# Patient Record
Sex: MALE | Race: WHITE | Employment: OTHER | URBAN - NONMETROPOLITAN AREA
[De-identification: names, ages, dates, MRNs, and addresses within clinical notes are randomized per-mention and may not be internally consistent; named-entity substitution may affect disease eponyms.]

---

## 2017-01-04 ENCOUNTER — TELEPHONE (OUTPATIENT)
Dept: FAMILY MEDICINE CLINIC | Age: 47
End: 2017-01-04

## 2017-02-23 ENCOUNTER — HOSPITAL ENCOUNTER (OUTPATIENT)
Dept: OTHER | Age: 47
Discharge: HOME | End: 2017-02-23
Attending: FAMILY MEDICINE | Admitting: FAMILY MEDICINE

## 2017-02-23 ENCOUNTER — OFFICE VISIT (OUTPATIENT)
Dept: FAMILY MEDICINE CLINIC | Age: 47
End: 2017-02-23
Payer: MEDICARE

## 2017-02-23 VITALS
DIASTOLIC BLOOD PRESSURE: 60 MMHG | HEART RATE: 68 BPM | OXYGEN SATURATION: 90 % | SYSTOLIC BLOOD PRESSURE: 108 MMHG | RESPIRATION RATE: 18 BRPM

## 2017-02-23 DIAGNOSIS — E03.9 HYPOTHYROIDISM, UNSPECIFIED TYPE: ICD-10-CM

## 2017-02-23 DIAGNOSIS — G40.909 SEIZURE DISORDER (HCC): ICD-10-CM

## 2017-02-23 DIAGNOSIS — N50.89 SCROTAL SWELLING: Primary | ICD-10-CM

## 2017-02-23 DIAGNOSIS — F84.0 AUTISM: ICD-10-CM

## 2017-02-23 LAB
A/G RATIO: 1 (ref 0.8–2)
ALBUMIN SERPL-MCNC: 4.1 G/DL (ref 3.4–4.8)
ALP BLD-CCNC: 53 U/L (ref 25–100)
ALT SERPL-CCNC: 33 U/L (ref 4–36)
ANION GAP SERPL CALCULATED.3IONS-SCNC: 14 MMOL/L (ref 3–16)
AST SERPL-CCNC: 23 U/L (ref 8–33)
BASOPHILS ABSOLUTE: 0.1 K/UL (ref 0–0.1)
BASOPHILS RELATIVE PERCENT: 0.5 %
BILIRUB SERPL-MCNC: 0.5 MG/DL (ref 0.3–1.2)
BUN BLDV-MCNC: 11 MG/DL (ref 6–20)
CALCIUM SERPL-MCNC: 9.4 MG/DL (ref 8.5–10.5)
CHLORIDE BLD-SCNC: 102 MMOL/L (ref 98–107)
CO2: 23 MMOL/L (ref 20–30)
CREAT SERPL-MCNC: 0.6 MG/DL (ref 0.4–1.2)
EOSINOPHILS ABSOLUTE: 0.3 K/UL (ref 0–0.4)
EOSINOPHILS RELATIVE PERCENT: 2.2 %
GFR AFRICAN AMERICAN: >59
GFR NON-AFRICAN AMERICAN: >59
GLOBULIN: 4 G/DL
GLUCOSE BLD-MCNC: 82 MG/DL (ref 74–106)
HCT VFR BLD CALC: 48.2 % (ref 40–54)
HEMOGLOBIN: 16 G/DL (ref 13–18)
LYMPHOCYTES ABSOLUTE: 4.9 K/UL (ref 1.5–4)
LYMPHOCYTES RELATIVE PERCENT: 42.4 % (ref 20–40)
MCH RBC QN AUTO: 30.2 PG (ref 27–32)
MCHC RBC AUTO-ENTMCNC: 33.2 G/DL (ref 31–35)
MCV RBC AUTO: 90.9 FL (ref 80–100)
MONOCYTES ABSOLUTE: 1.5 K/UL (ref 0.2–0.8)
MONOCYTES RELATIVE PERCENT: 12.8 % (ref 3–10)
NEUTROPHILS ABSOLUTE: 4.9 K/UL (ref 2–7.5)
NEUTROPHILS RELATIVE PERCENT: 42.1 %
PDW BLD-RTO: 14.2 % (ref 11–16)
PLATELET # BLD: 242 K/UL (ref 150–400)
PMV BLD AUTO: 12.9 FL (ref 6–10)
POTASSIUM SERPL-SCNC: 4.1 MMOL/L (ref 3.4–5.1)
RBC # BLD: 5.3 M/UL (ref 4.5–6)
SODIUM BLD-SCNC: 139 MMOL/L (ref 136–145)
T4 FREE: 1.06 NG/DL (ref 0.89–1.76)
TOTAL PROTEIN: 8.1 G/DL (ref 6.4–8.3)
TSH SERPL DL<=0.05 MIU/L-ACNC: 2.28 UIU/ML (ref 0.35–5.5)
WBC # BLD: 11.6 K/UL (ref 4–11)

## 2017-02-23 PROCEDURE — 1036F TOBACCO NON-USER: CPT | Performed by: FAMILY MEDICINE

## 2017-02-23 PROCEDURE — G8484 FLU IMMUNIZE NO ADMIN: HCPCS | Performed by: FAMILY MEDICINE

## 2017-02-23 PROCEDURE — G8427 DOCREV CUR MEDS BY ELIG CLIN: HCPCS | Performed by: FAMILY MEDICINE

## 2017-02-23 PROCEDURE — G8421 BMI NOT CALCULATED: HCPCS | Performed by: FAMILY MEDICINE

## 2017-02-23 PROCEDURE — 99214 OFFICE O/P EST MOD 30 MIN: CPT | Performed by: FAMILY MEDICINE

## 2017-02-23 RX ORDER — POLYETHYLENE GLYCOL 3350 17 G/17G
POWDER, FOR SOLUTION ORAL
Refills: 5 | COMMUNITY
Start: 2017-01-18 | End: 2017-06-14 | Stop reason: SDUPTHER

## 2017-02-28 DIAGNOSIS — N50.89 SCROTAL SWELLING: ICD-10-CM

## 2017-03-02 DIAGNOSIS — N50.89 MASS OF LEFT TESTICLE: Primary | ICD-10-CM

## 2017-06-14 ENCOUNTER — OFFICE VISIT (OUTPATIENT)
Dept: FAMILY MEDICINE CLINIC | Age: 47
End: 2017-06-14
Payer: MEDICARE

## 2017-06-14 VITALS
OXYGEN SATURATION: 96 % | SYSTOLIC BLOOD PRESSURE: 112 MMHG | DIASTOLIC BLOOD PRESSURE: 78 MMHG | RESPIRATION RATE: 16 BRPM | HEART RATE: 71 BPM

## 2017-06-14 DIAGNOSIS — J01.10 ACUTE FRONTAL SINUSITIS, RECURRENCE NOT SPECIFIED: Primary | ICD-10-CM

## 2017-06-14 DIAGNOSIS — E03.9 HYPOTHYROIDISM, UNSPECIFIED TYPE: ICD-10-CM

## 2017-06-14 DIAGNOSIS — K21.9 GASTROESOPHAGEAL REFLUX DISEASE WITHOUT ESOPHAGITIS: ICD-10-CM

## 2017-06-14 DIAGNOSIS — G40.909 SEIZURE DISORDER (HCC): ICD-10-CM

## 2017-06-14 DIAGNOSIS — K59.00 CONSTIPATION, UNSPECIFIED CONSTIPATION TYPE: ICD-10-CM

## 2017-06-14 DIAGNOSIS — F84.0 AUTISM: ICD-10-CM

## 2017-06-14 DIAGNOSIS — J40 BRONCHITIS: ICD-10-CM

## 2017-06-14 DIAGNOSIS — R53.83 FATIGUE, UNSPECIFIED TYPE: ICD-10-CM

## 2017-06-14 PROCEDURE — G8427 DOCREV CUR MEDS BY ELIG CLIN: HCPCS | Performed by: NURSE PRACTITIONER

## 2017-06-14 PROCEDURE — 99214 OFFICE O/P EST MOD 30 MIN: CPT | Performed by: NURSE PRACTITIONER

## 2017-06-14 PROCEDURE — 96372 THER/PROPH/DIAG INJ SC/IM: CPT | Performed by: NURSE PRACTITIONER

## 2017-06-14 PROCEDURE — 1036F TOBACCO NON-USER: CPT | Performed by: NURSE PRACTITIONER

## 2017-06-14 PROCEDURE — G8421 BMI NOT CALCULATED: HCPCS | Performed by: NURSE PRACTITIONER

## 2017-06-14 RX ORDER — FLUTICASONE PROPIONATE 50 MCG
2 SPRAY, SUSPENSION (ML) NASAL DAILY
Qty: 1 BOTTLE | Refills: 5 | Status: SHIPPED | OUTPATIENT
Start: 2017-06-14 | End: 2017-10-17 | Stop reason: ALTCHOICE

## 2017-06-14 RX ORDER — LEVOTHYROXINE SODIUM 50 MCG
50 TABLET ORAL DAILY
Qty: 30 TABLET | Refills: 5 | Status: SHIPPED | OUTPATIENT
Start: 2017-06-14 | End: 2017-11-07 | Stop reason: SDUPTHER

## 2017-06-14 RX ORDER — POLYETHYLENE GLYCOL 3350 17 G/17G
17 POWDER, FOR SOLUTION ORAL DAILY
Qty: 1 BOTTLE | Refills: 5 | Status: SHIPPED | OUTPATIENT
Start: 2017-06-14 | End: 2017-11-07 | Stop reason: SDUPTHER

## 2017-06-14 RX ORDER — DIVALPROEX SODIUM 250 MG
500 TABLET, DELAYED RELEASE (ENTERIC COATED) ORAL 3 TIMES DAILY
Qty: 180 TABLET | Refills: 5 | Status: SHIPPED | OUTPATIENT
Start: 2017-06-14 | End: 2017-11-07 | Stop reason: SDUPTHER

## 2017-06-14 RX ORDER — CEFTRIAXONE 1 G/1
1 INJECTION, POWDER, FOR SOLUTION INTRAMUSCULAR; INTRAVENOUS ONCE
Status: COMPLETED | OUTPATIENT
Start: 2017-06-14 | End: 2017-06-14

## 2017-06-14 RX ORDER — DOCUSATE SODIUM 100 MG/1
100 CAPSULE, LIQUID FILLED ORAL 2 TIMES DAILY
Qty: 60 CAPSULE | Refills: 5 | Status: SHIPPED | OUTPATIENT
Start: 2017-06-14 | End: 2017-10-17 | Stop reason: CLARIF

## 2017-06-14 RX ORDER — TOPIRAMATE 100 MG
100 TABLET ORAL 4 TIMES DAILY
Qty: 120 TABLET | Refills: 5 | Status: SHIPPED | OUTPATIENT
Start: 2017-06-14 | End: 2017-11-07 | Stop reason: SDUPTHER

## 2017-06-14 RX ORDER — AZITHROMYCIN 500 MG/1
500 TABLET, FILM COATED ORAL DAILY
Qty: 7 TABLET | Refills: 0 | Status: SHIPPED | OUTPATIENT
Start: 2017-06-14 | End: 2017-06-21

## 2017-06-14 RX ADMIN — CEFTRIAXONE 1 G: 1 INJECTION, POWDER, FOR SOLUTION INTRAMUSCULAR; INTRAVENOUS at 15:35

## 2017-06-14 ASSESSMENT — ENCOUNTER SYMPTOMS: COUGH: 1

## 2017-06-14 ASSESSMENT — PATIENT HEALTH QUESTIONNAIRE - PHQ9
2. FEELING DOWN, DEPRESSED OR HOPELESS: 0
SUM OF ALL RESPONSES TO PHQ QUESTIONS 1-9: 0
1. LITTLE INTEREST OR PLEASURE IN DOING THINGS: 0
SUM OF ALL RESPONSES TO PHQ9 QUESTIONS 1 & 2: 0

## 2017-06-20 ENCOUNTER — OFFICE VISIT (OUTPATIENT)
Dept: FAMILY MEDICINE CLINIC | Age: 47
End: 2017-06-20
Payer: MEDICARE

## 2017-06-20 VITALS
HEART RATE: 97 BPM | SYSTOLIC BLOOD PRESSURE: 128 MMHG | OXYGEN SATURATION: 90 % | DIASTOLIC BLOOD PRESSURE: 80 MMHG | RESPIRATION RATE: 16 BRPM

## 2017-06-20 DIAGNOSIS — J01.10 ACUTE FRONTAL SINUSITIS, RECURRENCE NOT SPECIFIED: Primary | ICD-10-CM

## 2017-06-20 DIAGNOSIS — G47.00 INSOMNIA, UNSPECIFIED TYPE: ICD-10-CM

## 2017-06-20 DIAGNOSIS — H66.91 RIGHT OTITIS MEDIA, UNSPECIFIED CHRONICITY, UNSPECIFIED OTITIS MEDIA TYPE: ICD-10-CM

## 2017-06-20 DIAGNOSIS — J40 BRONCHITIS: ICD-10-CM

## 2017-06-20 PROCEDURE — 1036F TOBACCO NON-USER: CPT | Performed by: NURSE PRACTITIONER

## 2017-06-20 PROCEDURE — G8427 DOCREV CUR MEDS BY ELIG CLIN: HCPCS | Performed by: NURSE PRACTITIONER

## 2017-06-20 PROCEDURE — 96372 THER/PROPH/DIAG INJ SC/IM: CPT | Performed by: NURSE PRACTITIONER

## 2017-06-20 PROCEDURE — 99213 OFFICE O/P EST LOW 20 MIN: CPT | Performed by: NURSE PRACTITIONER

## 2017-06-20 PROCEDURE — G8421 BMI NOT CALCULATED: HCPCS | Performed by: NURSE PRACTITIONER

## 2017-06-20 RX ORDER — AMOXICILLIN AND CLAVULANATE POTASSIUM 875; 125 MG/1; MG/1
1 TABLET, FILM COATED ORAL 2 TIMES DAILY
Qty: 20 TABLET | Refills: 0 | Status: SHIPPED | OUTPATIENT
Start: 2017-06-20 | End: 2017-06-30

## 2017-06-20 RX ORDER — TRAZODONE HYDROCHLORIDE 50 MG/1
TABLET ORAL
Qty: 60 TABLET | Refills: 3 | Status: SHIPPED | OUTPATIENT
Start: 2017-06-20 | End: 2017-11-07 | Stop reason: SDUPTHER

## 2017-06-20 RX ORDER — METHYLPREDNISOLONE SODIUM SUCCINATE 125 MG/2ML
125 INJECTION, POWDER, LYOPHILIZED, FOR SOLUTION INTRAMUSCULAR; INTRAVENOUS ONCE
Status: COMPLETED | OUTPATIENT
Start: 2017-06-20 | End: 2017-06-20

## 2017-06-20 RX ADMIN — METHYLPREDNISOLONE SODIUM SUCCINATE 125 MG: 125 INJECTION, POWDER, LYOPHILIZED, FOR SOLUTION INTRAMUSCULAR; INTRAVENOUS at 13:42

## 2017-06-30 ASSESSMENT — ENCOUNTER SYMPTOMS
SORE THROAT: 1
COUGH: 1
RHINORRHEA: 1
CONSTIPATION: 1
CONSTIPATION: 1
RHINORRHEA: 1
SORE THROAT: 1

## 2017-08-15 ENCOUNTER — OFFICE VISIT (OUTPATIENT)
Dept: FAMILY MEDICINE CLINIC | Age: 47
End: 2017-08-15
Payer: MEDICARE

## 2017-08-15 ENCOUNTER — HOSPITAL ENCOUNTER (OUTPATIENT)
Dept: OTHER | Age: 47
Discharge: HOME | End: 2017-08-15
Attending: FAMILY MEDICINE | Admitting: FAMILY MEDICINE

## 2017-08-15 VITALS
DIASTOLIC BLOOD PRESSURE: 78 MMHG | SYSTOLIC BLOOD PRESSURE: 112 MMHG | RESPIRATION RATE: 18 BRPM | OXYGEN SATURATION: 98 %

## 2017-08-15 DIAGNOSIS — L21.9 SEBORRHEIC DERMATITIS OF SCALP: ICD-10-CM

## 2017-08-15 DIAGNOSIS — E03.9 HYPOTHYROIDISM, UNSPECIFIED TYPE: ICD-10-CM

## 2017-08-15 DIAGNOSIS — L81.9 HYPERPIGMENTATION OF SKIN: ICD-10-CM

## 2017-08-15 DIAGNOSIS — R35.0 URINARY FREQUENCY: ICD-10-CM

## 2017-08-15 DIAGNOSIS — R23.9 IMPAIRED SKIN INTEGRITY: ICD-10-CM

## 2017-08-15 DIAGNOSIS — F84.0 AUTISM: ICD-10-CM

## 2017-08-15 DIAGNOSIS — R35.1 NOCTURIA: ICD-10-CM

## 2017-08-15 DIAGNOSIS — G40.909 SEIZURE DISORDER (HCC): ICD-10-CM

## 2017-08-15 DIAGNOSIS — Z99.3 WHEELCHAIR BOUND: ICD-10-CM

## 2017-08-15 DIAGNOSIS — R35.0 URINARY FREQUENCY: Primary | ICD-10-CM

## 2017-08-15 DIAGNOSIS — N39.0 URINARY TRACT INFECTION WITHOUT HEMATURIA, SITE UNSPECIFIED: ICD-10-CM

## 2017-08-15 DIAGNOSIS — R47.01 NONVERBAL: ICD-10-CM

## 2017-08-15 DIAGNOSIS — L30.9 DERMATITIS: ICD-10-CM

## 2017-08-15 DIAGNOSIS — B36.0 TINEA VERSICOLOR: ICD-10-CM

## 2017-08-15 DIAGNOSIS — R62.50 DEVELOPMENT DELAY: ICD-10-CM

## 2017-08-15 DIAGNOSIS — K59.00 CONSTIPATION, UNSPECIFIED CONSTIPATION TYPE: ICD-10-CM

## 2017-08-15 LAB
A/G RATIO: 1 (ref 0.8–2)
ALBUMIN SERPL-MCNC: 3.9 G/DL (ref 3.4–4.8)
ALP BLD-CCNC: 45 U/L (ref 25–100)
ALT SERPL-CCNC: 31 U/L (ref 4–36)
ANION GAP SERPL CALCULATED.3IONS-SCNC: 13 MMOL/L (ref 3–16)
AST SERPL-CCNC: 22 U/L (ref 8–33)
BASOPHILS ABSOLUTE: 0.1 K/UL (ref 0–0.1)
BASOPHILS RELATIVE PERCENT: 0.8 %
BILIRUB SERPL-MCNC: 0.3 MG/DL (ref 0.3–1.2)
BUN BLDV-MCNC: 9 MG/DL (ref 6–20)
CALCIUM SERPL-MCNC: 9.4 MG/DL (ref 8.5–10.5)
CHLORIDE BLD-SCNC: 103 MMOL/L (ref 98–107)
CO2: 24 MMOL/L (ref 20–30)
CREAT SERPL-MCNC: 0.6 MG/DL (ref 0.4–1.2)
EOSINOPHILS ABSOLUTE: 0.4 K/UL (ref 0–0.4)
EOSINOPHILS RELATIVE PERCENT: 4.1 %
GFR AFRICAN AMERICAN: >59
GFR NON-AFRICAN AMERICAN: >59
GLOBULIN: 3.8 G/DL
GLUCOSE BLD-MCNC: 83 MG/DL (ref 74–106)
HCT VFR BLD CALC: 47.2 % (ref 40–54)
HEMOGLOBIN: 15.9 G/DL (ref 13–18)
LYMPHOCYTES ABSOLUTE: 4 K/UL (ref 1.5–4)
LYMPHOCYTES RELATIVE PERCENT: 38.8 % (ref 20–40)
MCH RBC QN AUTO: 30.3 PG (ref 27–32)
MCHC RBC AUTO-ENTMCNC: 33.7 G/DL (ref 31–35)
MCV RBC AUTO: 89.9 FL (ref 80–100)
MONOCYTES ABSOLUTE: 1.2 K/UL (ref 0.2–0.8)
MONOCYTES RELATIVE PERCENT: 11.7 % (ref 3–10)
NEUTROPHILS ABSOLUTE: 4.6 K/UL (ref 2–7.5)
NEUTROPHILS RELATIVE PERCENT: 44.6 %
PDW BLD-RTO: 13.9 % (ref 11–16)
PLATELET # BLD: 228 K/UL (ref 150–400)
PMV BLD AUTO: 13 FL (ref 6–10)
POTASSIUM SERPL-SCNC: 4 MMOL/L (ref 3.4–5.1)
RBC # BLD: 5.25 M/UL (ref 4.5–6)
SODIUM BLD-SCNC: 140 MMOL/L (ref 136–145)
TOTAL PROTEIN: 7.7 G/DL (ref 6.4–8.3)
TSH SERPL DL<=0.05 MIU/L-ACNC: 1.48 UIU/ML (ref 0.35–5.5)
WBC # BLD: 10.3 K/UL (ref 4–11)

## 2017-08-15 PROCEDURE — 99214 OFFICE O/P EST MOD 30 MIN: CPT | Performed by: NURSE PRACTITIONER

## 2017-08-15 PROCEDURE — G8427 DOCREV CUR MEDS BY ELIG CLIN: HCPCS | Performed by: NURSE PRACTITIONER

## 2017-08-15 PROCEDURE — G8421 BMI NOT CALCULATED: HCPCS | Performed by: NURSE PRACTITIONER

## 2017-08-15 PROCEDURE — 1036F TOBACCO NON-USER: CPT | Performed by: NURSE PRACTITIONER

## 2017-08-16 ENCOUNTER — HOSPITAL ENCOUNTER (OUTPATIENT)
Dept: OTHER | Age: 47
Discharge: HOME | End: 2017-08-16
Attending: FAMILY MEDICINE | Admitting: FAMILY MEDICINE

## 2017-08-16 PROBLEM — L21.9 SEBORRHEIC DERMATITIS OF SCALP: Status: ACTIVE | Noted: 2017-08-16

## 2017-08-16 PROBLEM — R35.1 NOCTURIA: Status: ACTIVE | Noted: 2017-08-16

## 2017-08-16 PROBLEM — Z99.3 WHEELCHAIR BOUND: Status: ACTIVE | Noted: 2017-08-16

## 2017-08-16 PROBLEM — R62.50 DEVELOPMENT DELAY: Status: ACTIVE | Noted: 2017-08-16

## 2017-08-16 PROBLEM — R47.01 NONVERBAL: Status: ACTIVE | Noted: 2017-08-16

## 2017-08-16 RX ORDER — KETOCONAZOLE 20 MG/G
CREAM TOPICAL
Qty: 60 G | Refills: 2 | Status: SHIPPED | OUTPATIENT
Start: 2017-08-16 | End: 2017-10-17 | Stop reason: ALTCHOICE

## 2017-08-16 RX ORDER — KETOCONAZOLE 20 MG/ML
SHAMPOO TOPICAL
Qty: 120 ML | Refills: 5 | Status: SHIPPED | OUTPATIENT
Start: 2017-08-16 | End: 2017-10-17 | Stop reason: ALTCHOICE

## 2017-08-17 ENCOUNTER — TELEPHONE (OUTPATIENT)
Dept: FAMILY MEDICINE CLINIC | Age: 47
End: 2017-08-17

## 2017-08-17 LAB
VALPROIC ACID % FREE: 10 % (ref 5–18.4)
VALPROIC ACID LEVEL: 73 UG/ML (ref 50–100)
VALPROIC ACID, FREE: 7.3 UG/ML (ref 4–15)
VALPROIC DATE LAST DOSE: NORMAL
VALPROIC DOSE AMOUNT: NORMAL
VALPROIC TIME LAST DOSE: NORMAL

## 2017-08-19 LAB — URINE CULTURE, ROUTINE: NORMAL

## 2017-08-24 PROBLEM — R15.9 FULL INCONTINENCE OF FECES: Status: ACTIVE | Noted: 2017-08-24

## 2017-08-24 PROBLEM — N39.42 URINARY INCONTINENCE WITHOUT SENSORY AWARENESS: Status: ACTIVE | Noted: 2017-08-24

## 2017-10-17 ENCOUNTER — OFFICE VISIT (OUTPATIENT)
Dept: FAMILY MEDICINE CLINIC | Age: 47
End: 2017-10-17
Payer: MEDICARE

## 2017-10-17 VITALS
RESPIRATION RATE: 18 BRPM | DIASTOLIC BLOOD PRESSURE: 60 MMHG | OXYGEN SATURATION: 98 % | HEART RATE: 100 BPM | SYSTOLIC BLOOD PRESSURE: 114 MMHG

## 2017-10-17 DIAGNOSIS — Z23 INFLUENZA VACCINE NEEDED: ICD-10-CM

## 2017-10-17 DIAGNOSIS — R62.50 DEVELOPMENT DELAY: ICD-10-CM

## 2017-10-17 DIAGNOSIS — Z74.01 BEDRIDDEN: ICD-10-CM

## 2017-10-17 DIAGNOSIS — R21 SKIN RASH: Primary | ICD-10-CM

## 2017-10-17 DIAGNOSIS — N39.42 URINARY INCONTINENCE WITHOUT SENSORY AWARENESS: ICD-10-CM

## 2017-10-17 DIAGNOSIS — F84.0 AUTISM: ICD-10-CM

## 2017-10-17 DIAGNOSIS — Z99.3 WHEELCHAIR BOUND: ICD-10-CM

## 2017-10-17 DIAGNOSIS — R15.9 FULL INCONTINENCE OF FECES: ICD-10-CM

## 2017-10-17 DIAGNOSIS — G40.909 SEIZURE DISORDER (HCC): ICD-10-CM

## 2017-10-17 PROCEDURE — 90688 IIV4 VACCINE SPLT 0.5 ML IM: CPT | Performed by: NURSE PRACTITIONER

## 2017-10-17 PROCEDURE — G0008 ADMIN INFLUENZA VIRUS VAC: HCPCS | Performed by: NURSE PRACTITIONER

## 2017-10-17 PROCEDURE — 99214 OFFICE O/P EST MOD 30 MIN: CPT | Performed by: NURSE PRACTITIONER

## 2017-10-17 PROCEDURE — 1036F TOBACCO NON-USER: CPT | Performed by: NURSE PRACTITIONER

## 2017-10-17 PROCEDURE — G8421 BMI NOT CALCULATED: HCPCS | Performed by: NURSE PRACTITIONER

## 2017-10-17 PROCEDURE — G8484 FLU IMMUNIZE NO ADMIN: HCPCS | Performed by: NURSE PRACTITIONER

## 2017-10-17 PROCEDURE — G8427 DOCREV CUR MEDS BY ELIG CLIN: HCPCS | Performed by: NURSE PRACTITIONER

## 2017-10-17 RX ORDER — NYSTATIN 100000 [USP'U]/G
POWDER TOPICAL
Qty: 1 BOTTLE | Refills: 3 | Status: SHIPPED | OUTPATIENT
Start: 2017-10-17 | End: 2018-01-31 | Stop reason: ALTCHOICE

## 2017-10-17 NOTE — PROGRESS NOTES
OLDER, IM, MDV, 0.5ML (FLUZONE QUADV)   7. Wheelchair bound     8. Development delay          Orders Placed This Encounter   Procedures    INFLUENZA, QUADV, 3 YRS AND OLDER, IM, MDV, 0.5ML (FLUZONE QUADV)        Outpatient Encounter Prescriptions as of 10/17/2017   Medication Sig Dispense Refill    nystatin (MYCOSTATIN) 287386 UNIT/GM powder Apply to rash area 3 times daily. 1 Bottle 3    triamcinolone (KENALOG) 0.1 % ointment Apply on rash twice daily as needed. 30 g 5    traZODone (DESYREL) 50 MG tablet Take 1-2 tablets at bedtime for sleep. 60 tablet 3    polyethylene glycol (GLYCOLAX) powder Take 17 g by mouth daily 1 Bottle 5    SYNTHROID 50 MCG tablet Take 1 tablet by mouth daily 30 tablet 5    DEPAKOTE 250 MG DR tablet Take 2 tablets by mouth 3 times daily Take two tablets by mouth three times a day 180 tablet 5    NEXIUM 40 MG delayed release capsule Take 1 capsule by mouth daily 30 capsule 5    TOPAMAX 100 MG tablet Take 1 tablet by mouth 4 times daily 120 tablet 5    XALATAN 0.005 % ophthalmic solution       [DISCONTINUED] ketoconazole (NIZORAL) 2 % cream Apply topically daily to affected area (neck) for 2 weeks. . 60 g 2    [DISCONTINUED] ketoconazole (NIZORAL) 2 % shampoo Apply 10ml to wet scalp, shampoo, leave 5 minutes, and rinse. Do this daily for 2 weeks. 120 mL 5    [DISCONTINUED] fluticasone (FLONASE) 50 MCG/ACT nasal spray 2 sprays by Nasal route daily 1 Bottle 5    [DISCONTINUED] docusate sodium (COLACE) 100 MG capsule Take 1 capsule by mouth 2 times daily 60 capsule 5    levocetirizine (XYZAL) 5 MG tablet Take 1 tablet by mouth nightly 30 tablet 11    loratadine (CLARITIN) 10 MG tablet Take 1 tablet by mouth daily 30 tablet 1     No facility-administered encounter medications on file as of 10/17/2017. Return if symptoms worsen or fail to improve.       PATIENT COUNSELING    Counseling was provided today regarding the following topics: Healthy eating habits, Regular exercise, substance abuse and healthy sleep habits. Discussed use, benefit, and side effects of prescribed medications. Barriers to medication compliance addressed. All patient questions answered. Pt voiced understanding.

## 2017-10-31 ENCOUNTER — TELEPHONE (OUTPATIENT)
Dept: FAMILY MEDICINE CLINIC | Age: 47
End: 2017-10-31

## 2017-11-03 NOTE — TELEPHONE ENCOUNTER
Parents said that insurance wouldn't pay for this previously. I meant to call some places to see how much it would cost to rent one and I forgot about it. Would Medicare pay for this if we wrote a script for it? Parents are getting too elderly to lift him all the time. How can I go about getting this done or just seeing if it would be covered. Parents have tried before and had issues with getting any assistance for it.

## 2017-11-05 PROBLEM — Z74.01 BEDRIDDEN: Status: ACTIVE | Noted: 2017-11-05

## 2017-11-07 ENCOUNTER — OFFICE VISIT (OUTPATIENT)
Dept: FAMILY MEDICINE CLINIC | Age: 47
End: 2017-11-07
Payer: MEDICARE

## 2017-11-07 VITALS
DIASTOLIC BLOOD PRESSURE: 76 MMHG | OXYGEN SATURATION: 97 % | RESPIRATION RATE: 18 BRPM | SYSTOLIC BLOOD PRESSURE: 118 MMHG | HEART RATE: 64 BPM

## 2017-11-07 DIAGNOSIS — R15.9 FULL INCONTINENCE OF FECES: ICD-10-CM

## 2017-11-07 DIAGNOSIS — F84.0 AUTISM: ICD-10-CM

## 2017-11-07 DIAGNOSIS — G40.909 SEIZURE DISORDER (HCC): ICD-10-CM

## 2017-11-07 DIAGNOSIS — Z99.3 WHEELCHAIR BOUND: ICD-10-CM

## 2017-11-07 DIAGNOSIS — J30.89 ENVIRONMENTAL AND SEASONAL ALLERGIES: ICD-10-CM

## 2017-11-07 DIAGNOSIS — L30.9 ECZEMA, UNSPECIFIED TYPE: ICD-10-CM

## 2017-11-07 DIAGNOSIS — E03.9 HYPOTHYROIDISM, UNSPECIFIED TYPE: ICD-10-CM

## 2017-11-07 DIAGNOSIS — N39.42 URINARY INCONTINENCE WITHOUT SENSORY AWARENESS: ICD-10-CM

## 2017-11-07 DIAGNOSIS — R33.9 UNABLE TO EMPTY BLADDER: ICD-10-CM

## 2017-11-07 DIAGNOSIS — H02.826 MOLL'S GLAND CYST OF LEFT EYE: ICD-10-CM

## 2017-11-07 DIAGNOSIS — K59.00 CONSTIPATION, UNSPECIFIED CONSTIPATION TYPE: ICD-10-CM

## 2017-11-07 DIAGNOSIS — Z74.01 BEDRIDDEN: ICD-10-CM

## 2017-11-07 DIAGNOSIS — R39.16 URINARY STRAINING: ICD-10-CM

## 2017-11-07 DIAGNOSIS — R35.0 URINARY FREQUENCY: ICD-10-CM

## 2017-11-07 DIAGNOSIS — K21.9 GASTROESOPHAGEAL REFLUX DISEASE WITHOUT ESOPHAGITIS: ICD-10-CM

## 2017-11-07 DIAGNOSIS — L89.152 DECUBITUS ULCER OF SACRAL REGION, STAGE 2 (HCC): Primary | ICD-10-CM

## 2017-11-07 DIAGNOSIS — G47.00 INSOMNIA, UNSPECIFIED TYPE: ICD-10-CM

## 2017-11-07 PROCEDURE — G8421 BMI NOT CALCULATED: HCPCS | Performed by: NURSE PRACTITIONER

## 2017-11-07 PROCEDURE — 1036F TOBACCO NON-USER: CPT | Performed by: NURSE PRACTITIONER

## 2017-11-07 PROCEDURE — G8427 DOCREV CUR MEDS BY ELIG CLIN: HCPCS | Performed by: NURSE PRACTITIONER

## 2017-11-07 PROCEDURE — 99214 OFFICE O/P EST MOD 30 MIN: CPT | Performed by: NURSE PRACTITIONER

## 2017-11-07 PROCEDURE — G8484 FLU IMMUNIZE NO ADMIN: HCPCS | Performed by: NURSE PRACTITIONER

## 2017-11-07 RX ORDER — LORATADINE 10 MG/1
10 TABLET ORAL DAILY
Qty: 30 TABLET | Refills: 5 | Status: SHIPPED | OUTPATIENT
Start: 2017-11-07 | End: 2018-01-31 | Stop reason: SINTOL

## 2017-11-07 RX ORDER — TOPIRAMATE 100 MG
100 TABLET ORAL 4 TIMES DAILY
Qty: 120 TABLET | Refills: 5 | Status: SHIPPED | OUTPATIENT
Start: 2017-11-07 | End: 2018-01-11 | Stop reason: CLARIF

## 2017-11-07 RX ORDER — TAMSULOSIN HYDROCHLORIDE 0.4 MG/1
0.4 CAPSULE ORAL DAILY
Qty: 30 CAPSULE | Refills: 3 | Status: SHIPPED | OUTPATIENT
Start: 2017-11-07 | End: 2018-01-31

## 2017-11-07 RX ORDER — LEVOTHYROXINE SODIUM 50 MCG
50 TABLET ORAL DAILY
Qty: 30 TABLET | Refills: 5 | Status: SHIPPED | OUTPATIENT
Start: 2017-11-07 | End: 2018-01-31 | Stop reason: DRUGHIGH

## 2017-11-07 RX ORDER — TRAZODONE HYDROCHLORIDE 50 MG/1
TABLET ORAL
Qty: 60 TABLET | Refills: 5 | Status: SHIPPED | OUTPATIENT
Start: 2017-11-07 | End: 2018-01-31 | Stop reason: SINTOL

## 2017-11-07 RX ORDER — POLYETHYLENE GLYCOL 3350 17 G/17G
17 POWDER, FOR SOLUTION ORAL DAILY
Qty: 1 BOTTLE | Refills: 5 | Status: SHIPPED | OUTPATIENT
Start: 2017-11-07 | End: 2019-01-28 | Stop reason: SDUPTHER

## 2017-11-07 RX ORDER — DIVALPROEX SODIUM 250 MG
500 TABLET, DELAYED RELEASE (ENTERIC COATED) ORAL 3 TIMES DAILY
Qty: 180 TABLET | Refills: 5 | Status: SHIPPED | OUTPATIENT
Start: 2017-11-07 | End: 2018-01-31 | Stop reason: DRUGHIGH

## 2017-11-11 PROBLEM — L89.151 DECUBITUS ULCER OF SACRAL REGION, STAGE 1: Status: ACTIVE | Noted: 2017-11-11

## 2017-11-11 PROBLEM — L30.9 ECZEMA: Status: ACTIVE | Noted: 2017-11-11

## 2017-11-11 PROBLEM — L89.152 DECUBITUS ULCER OF SACRAL REGION, STAGE 2 (HCC): Status: ACTIVE | Noted: 2017-11-11

## 2017-11-11 PROBLEM — H02.826: Status: ACTIVE | Noted: 2017-11-11

## 2017-11-11 ASSESSMENT — ENCOUNTER SYMPTOMS: CONSTIPATION: 1

## 2017-11-11 NOTE — PROGRESS NOTES
SUBJECTIVE:  Allison Fonseca is a 50 y.o. male that presents with   Chief Complaint   Patient presents with    Rash     on buttocks   . HPI:    Patient presents for follow-up. Parents state that the rash on his sacrum has not resolved and is getting worse. Patient was laid supine and rolled onto his side with the assist of parents, family member, and nurses. Sacrum has non-blanchable redness with skin breakdown of the epidermis showing a stage II pressure ulcer. Family was educated on the need to rotate patient every 2 hours and relieve pressure off of the sacrum area. His insurance is willing to pay for a manual lift to help parents transfer patient from wheelchair to bed. His hospital bed is several years old and worn down. Insurance will be contacted about supplying patient with a bed that has some sort of a pressure relieving mattress such as a gel overlay, pressure relieving cushion for his wheelchair, and sacrum foam dressings to prevent further breakdown. Patient has full urinary and fecal incontinence and the foam dressing will also prevent infection due to contact with urine and feces. Home health referral has been made to assist with patient bathing and wound care. Parents report that patient is only urinating about 3 times daily. He gets full suprapubic distention and seems to frequently strain to urinate. When he does urinate it is a large amount. He is previously seen urology and was evaluated for prostate issues. He last saw urology one year ago and did not have any prostate issues at that time. He is drinking an adequate amount of water. Based upon patient's cues and behaviors parents feel that he is having urinary frequency but is unable to empty his bladder completely. Parents will attempt to get a urine sample at home and bring this by the clinic to rule out urinary tract infection. He has a history of constipation but the daily MiraLAX has helped to regulate his bowel movements.  He has a range of motion. Bilateral lower extremity weakness. Not able to stand on his own. Neurological: He is alert and oriented to person, place, and time. Skin: Skin is warm and dry. Rash noted. There is erythema. Stage I pressure ulcer around sacrum. Seborrheic dermatitis on scalp with flaking dandruff. Several small light brown macular patches on left lower neck. Redness of lower jaw bilaterally due to self inflicted injury. Eczema on lower left jaw. Psychiatric: He is slowed and withdrawn. He is noncommunicative. Developmentally delayed and nonverbal. He appeared calmer today while waiting. He is inattentive. Nursing note and vitals reviewed. No results found for requested labs within last 30 days. LDL Calculated (mg/dL)   Date Value   12/01/2016 138 (H)         Lab Results   Component Value Date    WBC 10.3 08/15/2017    NEUTROABS 4.6 08/15/2017    HGB 15.9 08/15/2017    HCT 47.2 08/15/2017    MCV 89.9 08/15/2017     08/15/2017       Lab Results   Component Value Date    TSH 1.48 08/15/2017         ASSESSMENT/PLAN:  1. Decubitus ulcer of sacral region, stage 2  Amb External Referral To Home Health   2. Urinary straining  tamsulosin (FLOMAX) 0.4 MG capsule   3. Eczema, unspecified type     4. Moll's gland cyst of left eye     5. Hypothyroidism, unspecified type  SYNTHROID 50 MCG tablet   6. Gastroesophageal reflux disease without esophagitis  NEXIUM 40 MG delayed release capsule   7. Constipation, unspecified constipation type  polyethylene glycol (GLYCOLAX) powder   8. Unable to empty bladder     9. Autism     10. Environmental and seasonal allergies  loratadine (CLARITIN) 10 MG tablet   11. Urinary frequency  tamsulosin (FLOMAX) 0.4 MG capsule   12. Wheelchair bound  DME Order for Hospital Bed as OP    Amb External Referral To Home Health   13. Bedridden  DME Order for Hospital Bed as OP    Amb External Referral To Home Health   14.  Full incontinence of feces  Amb External

## 2017-11-21 ENCOUNTER — TELEPHONE (OUTPATIENT)
Dept: FAMILY MEDICINE CLINIC | Age: 47
End: 2017-11-21

## 2017-11-21 DIAGNOSIS — G40.909 SEIZURE DISORDER (HCC): ICD-10-CM

## 2017-11-21 DIAGNOSIS — R47.01 NONVERBAL: ICD-10-CM

## 2017-11-21 DIAGNOSIS — R15.9 FULL INCONTINENCE OF FECES: ICD-10-CM

## 2017-11-21 DIAGNOSIS — L89.152 DECUBITUS ULCER OF SACRAL REGION, STAGE 2 (HCC): ICD-10-CM

## 2017-11-21 DIAGNOSIS — Z99.3 WHEELCHAIR BOUND: Primary | ICD-10-CM

## 2017-11-21 DIAGNOSIS — N39.42 URINARY INCONTINENCE WITHOUT SENSORY AWARENESS: ICD-10-CM

## 2017-11-21 DIAGNOSIS — F84.0 AUTISM: ICD-10-CM

## 2017-11-21 DIAGNOSIS — Z74.01 BEDRIDDEN: ICD-10-CM

## 2017-11-21 DIAGNOSIS — R62.50 DEVELOPMENT DELAY: ICD-10-CM

## 2017-11-21 NOTE — TELEPHONE ENCOUNTER
Patient's sister states has checked with waiver services and was told that [address] [facility] Jimy Mccullough would be able to see him along with waiver services.   She is requesting referral.

## 2018-01-09 ENCOUNTER — TELEPHONE (OUTPATIENT)
Dept: FAMILY MEDICINE CLINIC | Age: 48
End: 2018-01-09

## 2018-01-11 RX ORDER — TOPIRAMATE 100 MG/1
100 TABLET, FILM COATED ORAL 4 TIMES DAILY
Qty: 120 TABLET | Refills: 5 | Status: SHIPPED | OUTPATIENT
Start: 2018-01-11 | End: 2018-02-15 | Stop reason: DRUGHIGH

## 2018-01-31 ENCOUNTER — HOSPITAL ENCOUNTER (OUTPATIENT)
Dept: OTHER | Age: 48
Discharge: HOME | End: 2018-01-31
Attending: NURSE PRACTITIONER | Admitting: NURSE PRACTITIONER

## 2018-01-31 ENCOUNTER — OFFICE VISIT (OUTPATIENT)
Dept: FAMILY MEDICINE CLINIC | Age: 48
End: 2018-01-31
Payer: MEDICARE

## 2018-01-31 VITALS — RESPIRATION RATE: 24 BRPM | HEART RATE: 100 BPM | DIASTOLIC BLOOD PRESSURE: 64 MMHG | SYSTOLIC BLOOD PRESSURE: 120 MMHG

## 2018-01-31 DIAGNOSIS — R63.4 WEIGHT LOSS: ICD-10-CM

## 2018-01-31 DIAGNOSIS — F84.0 AUTISM: ICD-10-CM

## 2018-01-31 DIAGNOSIS — R13.19 ESOPHAGEAL DYSPHAGIA: Primary | ICD-10-CM

## 2018-01-31 DIAGNOSIS — R63.0 ANOREXIA: ICD-10-CM

## 2018-01-31 DIAGNOSIS — G40.909 SEIZURE DISORDER (HCC): ICD-10-CM

## 2018-01-31 DIAGNOSIS — E03.9 HYPOTHYROIDISM, UNSPECIFIED TYPE: ICD-10-CM

## 2018-01-31 PROCEDURE — G8427 DOCREV CUR MEDS BY ELIG CLIN: HCPCS | Performed by: NURSE PRACTITIONER

## 2018-01-31 PROCEDURE — G8421 BMI NOT CALCULATED: HCPCS | Performed by: NURSE PRACTITIONER

## 2018-01-31 PROCEDURE — 1036F TOBACCO NON-USER: CPT | Performed by: NURSE PRACTITIONER

## 2018-01-31 PROCEDURE — 99214 OFFICE O/P EST MOD 30 MIN: CPT | Performed by: NURSE PRACTITIONER

## 2018-01-31 PROCEDURE — G8484 FLU IMMUNIZE NO ADMIN: HCPCS | Performed by: NURSE PRACTITIONER

## 2018-01-31 RX ORDER — LEVOTHYROXINE SODIUM 0.05 MG/1
TABLET ORAL
COMMUNITY
End: 2018-02-15 | Stop reason: ALTCHOICE

## 2018-01-31 RX ORDER — DIVALPROEX SODIUM 250 MG/1
TABLET, DELAYED RELEASE ORAL
COMMUNITY
End: 2018-08-22 | Stop reason: CLARIF

## 2018-01-31 NOTE — PROGRESS NOTES
Have you seen any other physician or provider since your last visit yes [facility] Admission    Have you had any other diagnostic tests since your last visit? yes - Radiology/Labs during admission    Have you changed or stopped any medications since your last visit?  yes - Stopped Trazodone due to side effects
Take 1 tablet by mouth daily 30 tablet 5    [DISCONTINUED] tamsulosin (FLOMAX) 0.4 MG capsule Take 1 capsule by mouth daily 30 capsule 3    [DISCONTINUED] nystatin (MYCOSTATIN) 749723 UNIT/GM powder Apply to rash area 3 times daily. 1 Bottle 3    [DISCONTINUED] levocetirizine (XYZAL) 5 MG tablet Take 1 tablet by mouth nightly 30 tablet 11     No facility-administered encounter medications on file as of 1/31/2018. No Follow-up on file. PATIENT COUNSELING    Counseling was provided today regarding the following topics: Healthy eating habits, Regular exercise, substance abuse and healthy sleep habits. Discussed use, benefit, and side effects of prescribed medications. Barriers to medication compliance addressed. All patient questions answered. Pt voiced understanding.

## 2018-02-01 DIAGNOSIS — E03.9 HYPOTHYROIDISM, UNSPECIFIED TYPE: ICD-10-CM

## 2018-02-01 DIAGNOSIS — R13.19 ESOPHAGEAL DYSPHAGIA: ICD-10-CM

## 2018-02-01 DIAGNOSIS — R63.4 WEIGHT LOSS: ICD-10-CM

## 2018-02-01 LAB
A/G RATIO: 1 (ref 0.8–2)
ALBUMIN SERPL-MCNC: 4.1 G/DL (ref 3.4–4.8)
ALP BLD-CCNC: 48 U/L (ref 25–100)
ALT SERPL-CCNC: 30 U/L (ref 4–36)
ANION GAP SERPL CALCULATED.3IONS-SCNC: 16 MMOL/L (ref 3–16)
AST SERPL-CCNC: 21 U/L (ref 8–33)
BASOPHILS ABSOLUTE: 0.1 K/UL (ref 0–0.1)
BASOPHILS RELATIVE PERCENT: 0.6 %
BILIRUB SERPL-MCNC: 0.3 MG/DL (ref 0.3–1.2)
BUN BLDV-MCNC: 12 MG/DL (ref 6–20)
CALCIUM SERPL-MCNC: 9.4 MG/DL (ref 8.5–10.5)
CHLORIDE BLD-SCNC: 103 MMOL/L (ref 98–107)
CO2: 22 MMOL/L (ref 20–30)
CREAT SERPL-MCNC: 0.6 MG/DL (ref 0.4–1.2)
EOSINOPHILS ABSOLUTE: 0.4 K/UL (ref 0–0.4)
EOSINOPHILS RELATIVE PERCENT: 3.3 %
GFR AFRICAN AMERICAN: >59
GFR NON-AFRICAN AMERICAN: >59
GLOBULIN: 4.3 G/DL
GLUCOSE BLD-MCNC: 83 MG/DL (ref 74–106)
HCT VFR BLD CALC: 51.5 % (ref 40–54)
HEMOGLOBIN: 16.7 G/DL (ref 13–18)
IMMATURE GRANULOCYTES #: 0.1 K/UL
IMMATURE GRANULOCYTES %: 0.8 % (ref 0–5)
LYMPHOCYTES ABSOLUTE: 4.2 K/UL (ref 1.5–4)
LYMPHOCYTES RELATIVE PERCENT: 30.9 %
MCH RBC QN AUTO: 30.4 PG (ref 27–32)
MCHC RBC AUTO-ENTMCNC: 32.4 G/DL (ref 31–35)
MCV RBC AUTO: 93.8 FL (ref 80–100)
MONOCYTES ABSOLUTE: 1.8 K/UL (ref 0.2–0.8)
MONOCYTES RELATIVE PERCENT: 13.1 %
NEUTROPHILS ABSOLUTE: 6.9 K/UL (ref 2–7.5)
NEUTROPHILS RELATIVE PERCENT: 51.3 %
PDW BLD-RTO: 13.9 % (ref 11–16)
PLATELET # BLD: 187 K/UL (ref 150–400)
PMV BLD AUTO: 15 FL (ref 6–10)
POTASSIUM SERPL-SCNC: 4.6 MMOL/L (ref 3.4–5.1)
RBC # BLD: 5.49 M/UL (ref 4.5–6)
SODIUM BLD-SCNC: 141 MMOL/L (ref 136–145)
T4 FREE: 0.87 NG/DL (ref 0.89–1.76)
TOTAL PROTEIN: 8.4 G/DL (ref 6.4–8.3)
TSH REFLEX: 1.31 UIU/ML (ref 0.35–5.5)
WBC # BLD: 13.5 K/UL (ref 4–11)

## 2018-02-15 ENCOUNTER — OFFICE VISIT (OUTPATIENT)
Dept: FAMILY MEDICINE CLINIC | Age: 48
End: 2018-02-15
Payer: MEDICARE

## 2018-02-15 VITALS — HEART RATE: 65 BPM | OXYGEN SATURATION: 97 %

## 2018-02-15 DIAGNOSIS — E03.9 HYPOTHYROIDISM, UNSPECIFIED TYPE: Primary | ICD-10-CM

## 2018-02-15 DIAGNOSIS — K58.2 IRRITABLE BOWEL SYNDROME WITH BOTH CONSTIPATION AND DIARRHEA: ICD-10-CM

## 2018-02-15 DIAGNOSIS — L98.421 SKIN ULCER OF SACRUM, LIMITED TO BREAKDOWN OF SKIN (HCC): ICD-10-CM

## 2018-02-15 DIAGNOSIS — F84.0 AUTISM: ICD-10-CM

## 2018-02-15 DIAGNOSIS — G40.909 SEIZURE DISORDER (HCC): ICD-10-CM

## 2018-02-15 PROCEDURE — G8427 DOCREV CUR MEDS BY ELIG CLIN: HCPCS | Performed by: NURSE PRACTITIONER

## 2018-02-15 PROCEDURE — 1036F TOBACCO NON-USER: CPT | Performed by: NURSE PRACTITIONER

## 2018-02-15 PROCEDURE — 3017F COLORECTAL CA SCREEN DOC REV: CPT | Performed by: NURSE PRACTITIONER

## 2018-02-15 PROCEDURE — G8482 FLU IMMUNIZE ORDER/ADMIN: HCPCS | Performed by: NURSE PRACTITIONER

## 2018-02-15 PROCEDURE — G8421 BMI NOT CALCULATED: HCPCS | Performed by: NURSE PRACTITIONER

## 2018-02-15 PROCEDURE — 99214 OFFICE O/P EST MOD 30 MIN: CPT | Performed by: NURSE PRACTITIONER

## 2018-02-15 RX ORDER — LEVOTHYROXINE SODIUM 0.03 MG/1
25 TABLET ORAL DAILY
Qty: 30 TABLET | Refills: 3 | Status: SHIPPED | OUTPATIENT
Start: 2018-02-15 | End: 2018-07-13 | Stop reason: SDUPTHER

## 2018-02-15 RX ORDER — TOPIRAMATE 100 MG/1
100 TABLET, FILM COATED ORAL 2 TIMES DAILY
COMMUNITY
End: 2019-01-27

## 2018-02-16 ASSESSMENT — ENCOUNTER SYMPTOMS: CONSTIPATION: 1

## 2018-02-25 ASSESSMENT — ENCOUNTER SYMPTOMS: CONSTIPATION: 1

## 2018-03-16 ENCOUNTER — TELEPHONE (OUTPATIENT)
Dept: FAMILY MEDICINE CLINIC | Age: 48
End: 2018-03-16

## 2018-03-16 DIAGNOSIS — R21 SKIN RASH: ICD-10-CM

## 2018-03-16 RX ORDER — NYSTATIN 100000 [USP'U]/G
POWDER TOPICAL
Qty: 1 BOTTLE | Refills: 3 | Status: SHIPPED | OUTPATIENT
Start: 2018-03-16 | End: 2018-09-19 | Stop reason: SDUPTHER

## 2018-03-16 RX ORDER — NYSTATIN 100000 U/G
CREAM TOPICAL
Qty: 30 G | Refills: 2 | Status: SHIPPED | OUTPATIENT
Start: 2018-03-16 | End: 2018-09-19 | Stop reason: SDUPTHER

## 2018-03-16 RX ORDER — FLUCONAZOLE 100 MG/1
TABLET ORAL
Qty: 2 TABLET | Refills: 0 | Status: SHIPPED | OUTPATIENT
Start: 2018-03-16 | End: 2018-09-19 | Stop reason: ALTCHOICE

## 2018-03-31 PROBLEM — K58.2 IRRITABLE BOWEL SYNDROME WITH BOTH CONSTIPATION AND DIARRHEA: Status: ACTIVE | Noted: 2018-03-31

## 2018-06-19 DIAGNOSIS — J01.10 ACUTE FRONTAL SINUSITIS, RECURRENCE NOT SPECIFIED: ICD-10-CM

## 2018-06-19 RX ORDER — FLUTICASONE PROPIONATE 50 MCG
SPRAY, SUSPENSION (ML) NASAL
Qty: 16 G | Refills: 5 | Status: SHIPPED | OUTPATIENT
Start: 2018-06-19

## 2018-07-13 DIAGNOSIS — E03.9 HYPOTHYROIDISM, UNSPECIFIED TYPE: ICD-10-CM

## 2018-07-13 RX ORDER — LEVOTHYROXINE SODIUM 25 MCG
TABLET ORAL
Qty: 30 TABLET | Refills: 3 | Status: SHIPPED | OUTPATIENT
Start: 2018-07-13 | End: 2018-09-19 | Stop reason: SDUPTHER

## 2018-08-22 ENCOUNTER — TELEPHONE (OUTPATIENT)
Dept: FAMILY MEDICINE CLINIC | Age: 48
End: 2018-08-22

## 2018-08-22 DIAGNOSIS — G40.909 SEIZURE DISORDER (HCC): ICD-10-CM

## 2018-08-22 RX ORDER — DIVALPROEX SODIUM 125 MG/1
500 CAPSULE, COATED PELLETS ORAL 3 TIMES DAILY
Qty: 360 CAPSULE | Refills: 5 | Status: SHIPPED | OUTPATIENT
Start: 2018-08-22 | End: 2019-02-26 | Stop reason: SDUPTHER

## 2018-08-22 NOTE — TELEPHONE ENCOUNTER
The appt was for the Mom and this was mentioned on the way out the door. I forgot. I'll put them in now. Thanks.

## 2018-08-22 NOTE — TELEPHONE ENCOUNTER
Phone call from [address], friend of family calling on behalf or patient to check status of depakote sprinkles, states they have not been sent to pharmacy.

## 2018-09-19 ENCOUNTER — OFFICE VISIT (OUTPATIENT)
Dept: FAMILY MEDICINE CLINIC | Age: 48
End: 2018-09-19
Payer: MEDICARE

## 2018-09-19 ENCOUNTER — HOSPITAL ENCOUNTER (OUTPATIENT)
Facility: HOSPITAL | Age: 48
Discharge: HOME | End: 2018-09-19
Payer: MEDICARE

## 2018-09-19 VITALS
SYSTOLIC BLOOD PRESSURE: 98 MMHG | RESPIRATION RATE: 20 BRPM | DIASTOLIC BLOOD PRESSURE: 80 MMHG | OXYGEN SATURATION: 94 % | HEART RATE: 69 BPM

## 2018-09-19 DIAGNOSIS — Z13.1 SCREENING FOR DIABETES MELLITUS: ICD-10-CM

## 2018-09-19 DIAGNOSIS — Z13.0 SCREENING FOR BLOOD DISEASE: ICD-10-CM

## 2018-09-19 DIAGNOSIS — Z79.899 ON VALPROIC ACID THERAPY: ICD-10-CM

## 2018-09-19 DIAGNOSIS — L30.9 ECZEMA, UNSPECIFIED TYPE: ICD-10-CM

## 2018-09-19 DIAGNOSIS — K21.9 GASTROESOPHAGEAL REFLUX DISEASE WITHOUT ESOPHAGITIS: Primary | ICD-10-CM

## 2018-09-19 DIAGNOSIS — Z23 INFLUENZA VACCINE NEEDED: ICD-10-CM

## 2018-09-19 DIAGNOSIS — B37.2 SKIN YEAST INFECTION: ICD-10-CM

## 2018-09-19 DIAGNOSIS — E03.9 HYPOTHYROIDISM, UNSPECIFIED TYPE: ICD-10-CM

## 2018-09-19 DIAGNOSIS — R73.9 HYPERGLYCEMIA: ICD-10-CM

## 2018-09-19 DIAGNOSIS — K59.00 CONSTIPATION, UNSPECIFIED CONSTIPATION TYPE: ICD-10-CM

## 2018-09-19 PROCEDURE — 90688 IIV4 VACCINE SPLT 0.5 ML IM: CPT | Performed by: NURSE PRACTITIONER

## 2018-09-19 PROCEDURE — 99214 OFFICE O/P EST MOD 30 MIN: CPT | Performed by: NURSE PRACTITIONER

## 2018-09-19 PROCEDURE — 85025 COMPLETE CBC W/AUTO DIFF WBC: CPT

## 2018-09-19 PROCEDURE — G8427 DOCREV CUR MEDS BY ELIG CLIN: HCPCS | Performed by: NURSE PRACTITIONER

## 2018-09-19 PROCEDURE — 83036 HEMOGLOBIN GLYCOSYLATED A1C: CPT

## 2018-09-19 PROCEDURE — 80053 COMPREHEN METABOLIC PANEL: CPT

## 2018-09-19 PROCEDURE — 36415 COLL VENOUS BLD VENIPUNCTURE: CPT

## 2018-09-19 PROCEDURE — G0008 ADMIN INFLUENZA VIRUS VAC: HCPCS | Performed by: NURSE PRACTITIONER

## 2018-09-19 PROCEDURE — 80165 DIPROPYLACETIC ACID FREE: CPT

## 2018-09-19 PROCEDURE — 84443 ASSAY THYROID STIM HORMONE: CPT

## 2018-09-19 PROCEDURE — 3017F COLORECTAL CA SCREEN DOC REV: CPT | Performed by: NURSE PRACTITIONER

## 2018-09-19 PROCEDURE — 1036F TOBACCO NON-USER: CPT | Performed by: NURSE PRACTITIONER

## 2018-09-19 PROCEDURE — 84439 ASSAY OF FREE THYROXINE: CPT

## 2018-09-19 PROCEDURE — 80164 ASSAY DIPROPYLACETIC ACD TOT: CPT

## 2018-09-19 PROCEDURE — G8421 BMI NOT CALCULATED: HCPCS | Performed by: NURSE PRACTITIONER

## 2018-09-19 RX ORDER — LATANOPROST 0.005 %
2 DROPS OPHTHALMIC (EYE) NIGHTLY
Qty: 1 BOTTLE | Refills: 11 | Status: SHIPPED | OUTPATIENT
Start: 2018-09-19

## 2018-09-19 RX ORDER — NYSTATIN 100000 [USP'U]/G
POWDER TOPICAL
Qty: 1 BOTTLE | Refills: 11 | Status: SHIPPED | OUTPATIENT
Start: 2018-09-19

## 2018-09-19 RX ORDER — NYSTATIN 100000 U/G
CREAM TOPICAL
Qty: 30 G | Refills: 11 | Status: SHIPPED | OUTPATIENT
Start: 2018-09-19

## 2018-09-19 RX ORDER — LEVOTHYROXINE SODIUM 25 MCG
TABLET ORAL
Qty: 30 TABLET | Refills: 11 | Status: SHIPPED | OUTPATIENT
Start: 2018-09-19 | End: 2019-10-14 | Stop reason: SDUPTHER

## 2018-09-20 LAB
A/G RATIO: 0.9 (ref 0.8–2)
ALBUMIN SERPL-MCNC: 3.7 G/DL (ref 3.4–4.8)
ALP BLD-CCNC: 47 U/L (ref 25–100)
ALT SERPL-CCNC: 38 U/L (ref 4–36)
ANION GAP SERPL CALCULATED.3IONS-SCNC: 11 MMOL/L (ref 3–16)
AST SERPL-CCNC: 31 U/L (ref 8–33)
BASOPHILS ABSOLUTE: 0.1 K/UL (ref 0–0.1)
BASOPHILS RELATIVE PERCENT: 0.8 %
BILIRUB SERPL-MCNC: 0.3 MG/DL (ref 0.3–1.2)
BUN BLDV-MCNC: 11 MG/DL (ref 6–20)
CALCIUM SERPL-MCNC: 9.4 MG/DL (ref 8.5–10.5)
CHLORIDE BLD-SCNC: 108 MMOL/L (ref 98–107)
CO2: 24 MMOL/L (ref 20–30)
CREAT SERPL-MCNC: 0.6 MG/DL (ref 0.4–1.2)
EOSINOPHILS ABSOLUTE: 0.7 K/UL (ref 0–0.4)
EOSINOPHILS RELATIVE PERCENT: 5.7 %
GFR AFRICAN AMERICAN: >59
GFR NON-AFRICAN AMERICAN: >59
GLOBULIN: 4.2 G/DL
GLUCOSE BLD-MCNC: 83 MG/DL (ref 74–106)
HBA1C MFR BLD: 5.4 %
HCT VFR BLD CALC: 47.6 % (ref 40–54)
HEMOGLOBIN: 15.3 G/DL (ref 13–18)
IMMATURE GRANULOCYTES #: 0.1 K/UL
IMMATURE GRANULOCYTES %: 0.4 % (ref 0–5)
LYMPHOCYTES ABSOLUTE: 5.2 K/UL (ref 1.5–4)
LYMPHOCYTES RELATIVE PERCENT: 41.3 %
MCH RBC QN AUTO: 31.2 PG (ref 27–32)
MCHC RBC AUTO-ENTMCNC: 32.1 G/DL (ref 31–35)
MCV RBC AUTO: 96.9 FL (ref 80–100)
MONOCYTES ABSOLUTE: 1.7 K/UL (ref 0.2–0.8)
MONOCYTES RELATIVE PERCENT: 13.2 %
NEUTROPHILS ABSOLUTE: 4.8 K/UL (ref 2–7.5)
NEUTROPHILS RELATIVE PERCENT: 38.6 %
PDW BLD-RTO: 13.9 % (ref 11–16)
PLATELET # BLD: 183 K/UL (ref 150–400)
PMV BLD AUTO: 14.6 FL (ref 6–10)
POTASSIUM SERPL-SCNC: 4.5 MMOL/L (ref 3.4–5.1)
RBC # BLD: 4.91 M/UL (ref 4.5–6)
SODIUM BLD-SCNC: 143 MMOL/L (ref 136–145)
T4 FREE: 1.04 NG/DL (ref 0.89–1.76)
TOTAL PROTEIN: 7.9 G/DL (ref 6.4–8.3)
TSH REFLEX: 1.76 UIU/ML (ref 0.35–5.5)
WBC # BLD: 12.5 K/UL (ref 4–11)

## 2018-09-22 LAB
VALPROIC ACID % FREE: 11.9 % (ref 5–18.4)
VALPROIC ACID LEVEL: 84 UG/ML (ref 50–125)
VALPROIC ACID, FREE: 10 UG/ML (ref 7–23)
VALPROIC DATE LAST DOSE: NORMAL
VALPROIC DOSE AMOUNT: NORMAL
VALPROIC TIME LAST DOSE: NORMAL

## 2018-10-14 ASSESSMENT — ENCOUNTER SYMPTOMS: CONSTIPATION: 1

## 2018-10-14 NOTE — PROGRESS NOTES
Constitutional: He is oriented to person, place, and time. He appears well-developed and well-nourished. HENT:   Head: Normocephalic and atraumatic. Right Ear: External ear normal.   Left Ear: External ear normal.   Nose: Nose normal.   Mouth/Throat: Oropharynx is clear and moist.   Eyes: Pupils are equal, round, and reactive to light. Conjunctivae and EOM are normal.       Moll's gland cyst of left eye   Neck: Normal range of motion. Neck supple. Cardiovascular: Normal rate, regular rhythm and normal heart sounds. Pulmonary/Chest: Effort normal and breath sounds normal.   Abdominal: Soft. Bowel sounds are normal.   Musculoskeletal: Normal range of motion. Bilateral lower extremity weakness. Not able to stand on his own. Neurological: He is alert and oriented to person, place, and time. Skin: Skin is warm and dry. Rash noted. Red rash with satellite lesions between his legs and around groin. Seborrheic dermatitis on scalp with flaking dandruff. Several small light brown macular patches on left lower neck. Eczema on lower left jaw. Sacral pressure ulcer has healed. Psychiatric: His behavior is normal. He is noncommunicative. He is nonverbal but does communicate with some sign language. He is inattentive. Nursing note and vitals reviewed.       Results in Past 30 Days  Result Component Current Result Ref Range Previous Result Ref Range   Alb 3.7 (9/19/2018) 3.4 - 4.8 g/dL Not in Time Range    Albumin/Globulin Ratio 0.9 (9/19/2018) 0.8 - 2.0 Not in Time Range    Alkaline Phosphatase 47 (9/19/2018) 25 - 100 U/L Not in Time Range    ALT 38 (H) (9/19/2018) 4 - 36 U/L Not in Time Range    AST 31 (9/19/2018) 8 - 33 U/L Not in Time Range    BUN 11 (9/19/2018) 6 - 20 mg/dL Not in Time Range    Calcium 9.4 (9/19/2018) 8.5 - 10.5 mg/dL Not in Time Range    Chloride 108 (H) (9/19/2018) 98 - 107 mmol/L Not in Time Range    CO2 24 (9/19/2018) 20 - 30 mmol/L Not in Time Range    CREATININE 0.6 (9/19/2018)

## 2019-01-27 RX ORDER — TOPIRAMATE 100 MG/1
TABLET, FILM COATED ORAL
Qty: 120 TABLET | Refills: 5 | Status: SHIPPED | OUTPATIENT
Start: 2019-01-27

## 2019-01-28 DIAGNOSIS — K59.00 CONSTIPATION, UNSPECIFIED CONSTIPATION TYPE: ICD-10-CM

## 2019-01-28 RX ORDER — POLYETHYLENE GLYCOL 3350 17 G/17G
POWDER, FOR SOLUTION ORAL
Qty: 527 G | Refills: 5 | Status: SHIPPED | OUTPATIENT
Start: 2019-01-28

## 2019-02-27 RX ORDER — DIVALPROEX SODIUM 125 MG/1
500 CAPSULE, COATED PELLETS ORAL 3 TIMES DAILY
Qty: 360 CAPSULE | Refills: 5 | Status: SHIPPED | OUTPATIENT
Start: 2019-02-27 | End: 2019-08-24 | Stop reason: SDUPTHER

## 2019-03-14 ENCOUNTER — HOSPITAL ENCOUNTER (OUTPATIENT)
Facility: HOSPITAL | Age: 49
Discharge: HOME | End: 2019-03-14
Payer: MEDICARE

## 2019-03-14 ENCOUNTER — OFFICE VISIT (OUTPATIENT)
Dept: FAMILY MEDICINE CLINIC | Age: 49
End: 2019-03-14
Payer: MEDICARE

## 2019-03-14 ENCOUNTER — TELEPHONE (OUTPATIENT)
Dept: FAMILY MEDICINE CLINIC | Age: 49
End: 2019-03-14

## 2019-03-14 VITALS
OXYGEN SATURATION: 96 % | SYSTOLIC BLOOD PRESSURE: 102 MMHG | HEART RATE: 64 BPM | DIASTOLIC BLOOD PRESSURE: 68 MMHG | RESPIRATION RATE: 16 BRPM

## 2019-03-14 DIAGNOSIS — R09.81 SINUS CONGESTION: Primary | ICD-10-CM

## 2019-03-14 DIAGNOSIS — E03.9 ACQUIRED HYPOTHYROIDISM: ICD-10-CM

## 2019-03-14 DIAGNOSIS — H61.23 EXCESSIVE CERUMEN IN EAR CANAL, BILATERAL: ICD-10-CM

## 2019-03-14 DIAGNOSIS — R05.9 COUGH: ICD-10-CM

## 2019-03-14 DIAGNOSIS — G40.909 SEIZURE DISORDER (HCC): ICD-10-CM

## 2019-03-14 PROCEDURE — 99214 OFFICE O/P EST MOD 30 MIN: CPT | Performed by: NURSE PRACTITIONER

## 2019-03-14 PROCEDURE — 3017F COLORECTAL CA SCREEN DOC REV: CPT | Performed by: NURSE PRACTITIONER

## 2019-03-14 PROCEDURE — 1036F TOBACCO NON-USER: CPT | Performed by: NURSE PRACTITIONER

## 2019-03-14 PROCEDURE — 84443 ASSAY THYROID STIM HORMONE: CPT

## 2019-03-14 PROCEDURE — 80053 COMPREHEN METABOLIC PANEL: CPT

## 2019-03-14 PROCEDURE — G8482 FLU IMMUNIZE ORDER/ADMIN: HCPCS | Performed by: NURSE PRACTITIONER

## 2019-03-14 PROCEDURE — G8421 BMI NOT CALCULATED: HCPCS | Performed by: NURSE PRACTITIONER

## 2019-03-14 PROCEDURE — G8427 DOCREV CUR MEDS BY ELIG CLIN: HCPCS | Performed by: NURSE PRACTITIONER

## 2019-03-14 PROCEDURE — 85025 COMPLETE CBC W/AUTO DIFF WBC: CPT

## 2019-03-14 RX ORDER — CETIRIZINE HYDROCHLORIDE 10 MG/1
10 TABLET ORAL DAILY
Qty: 90 TABLET | Refills: 1 | Status: SHIPPED | OUTPATIENT
Start: 2019-03-14

## 2019-03-14 RX ORDER — BROMPHENIRAMINE MALEATE, PSEUDOEPHEDRINE HYDROCHLORIDE, AND DEXTROMETHORPHAN HYDROBROMIDE 2; 30; 10 MG/5ML; MG/5ML; MG/5ML
5 SYRUP ORAL 3 TIMES DAILY PRN
Qty: 150 ML | Refills: 0 | COMMUNITY
Start: 2019-03-14 | End: 2019-03-24

## 2019-03-14 RX ORDER — DIVALPROEX SODIUM 125 MG/1
500 CAPSULE, COATED PELLETS ORAL 3 TIMES DAILY
Qty: 360 CAPSULE | Refills: 5 | Status: CANCELLED | OUTPATIENT
Start: 2019-03-14 | End: 2019-04-13

## 2019-03-14 ASSESSMENT — PATIENT HEALTH QUESTIONNAIRE - PHQ9: DEPRESSION UNABLE TO ASSESS: FUNCTIONAL CAPACITY MOTIVATION LIMITS ACCURACY

## 2019-03-14 NOTE — PROGRESS NOTES
SUBJECTIVE:    Bia Munoz is a 44 y.o. male . Chief Complaint   Patient presents with    Chest Congestion    Seizures        HPI:   Assuming care of patient today. Previous PCP was [facility]. Patient is a 73-year-old  male with severe developmental delay, nonverbal, bedridden. He is accompanied by his parents today. And arrives ambulance transport. Per patient's father, Becca Gr has had severe seizure disorder since age 13. He is currently at home under the care of his parents. Dad provides most care with mom offering assistance as possible. Dad reports current tonic-clonic seizure activity about every 12 days. He reports this is patient's normal.  His seizure activity last a couple of minutes, he then has postictal phase and sleeps afterwards. He is currently on Depakote sprinkles for seizure control. He is bringing him in today with complaint of cough, congestion. With worry that he may have aspirated or have pneumonia as he seems to not be able to clear his secretions well. Patient is afebrile, he is warm to touch, he is calm at this time. He does not appear to be in any distress at this time . He follows voices, but otherwise does not respond to direction. His oral mucosa appears somewhat dry. Mom has bottle of water which she states patient does not drink water, as he does not like it. But when she offers him water he drinks nearly the entire bottle over the course of the visit. Dad reports patient eats well, and drinks lots of juice at home. Seizures   Associated symptoms include congestion and coughing. Pertinent negatives include no diaphoresis or fever. Allergies   Allergen Reactions    Haldol [Haloperidol] Other (See Comments)     Seizures      Past Medical History:   Diagnosis Date    Restless legs syndrome     Seizures ([address]       History reviewed. No pertinent family history.    Social History     Socioeconomic History    Marital status: seizures. Nonverbal   Psychiatric/Behavioral:        Calm at present. No self-mutilation at this time. All other systems reviewed and are negative. OBJECTIVE:    /68   Pulse 64   Resp 16   SpO2 96% Comment: room air  BP Readings from Last 3 Encounters:   03/14/19 102/68   09/19/18 98/80   01/31/18 120/64     There is no height or weight on file to calculate BMI. Physical Exam   Constitutional: He appears well-developed and well-nourished. Eyes: Pupils are equal, round, and reactive to light. Conjunctivae are normal.   Neck: Normal range of motion. Neck supple. No JVD present. No tracheal deviation present. No thyromegaly present. Cardiovascular: Normal rate, regular rhythm, normal heart sounds and intact distal pulses. Exam reveals no gallop and no friction rub. No murmur heard. Pulmonary/Chest: Effort normal and breath sounds normal. No stridor. No respiratory distress. He has no wheezes. He has no rales. Abdominal: Soft. Bowel sounds are normal. He exhibits no distension. Lymphadenopathy:     He has no cervical adenopathy. Neurological:   Severely developmentally delayed, nonverbal, does not follow requests, follows sounds with eyes, drinks one fluids are offered. Moves arms and legs independently. Skin: Skin is warm and dry. Capillary refill takes less than 2 seconds. Rash (eczematous rash beneath mouth) noted. Psychiatric:   Calm   Nursing note and vitals reviewed. ASSESSMENT / Brisa Barrientos was seen today for chest congestion and seizures. Diagnoses and all orders for this visit:    Sinus congestion  -     cetirizine (ZYRTEC) 10 MG tablet; Take 1 tablet by mouth daily    Cough  -     brompheniramine-pseudoephedrine-DM 2-30-10 MG/5ML syrup; Take 5 mLs by mouth 3 times daily as needed for Congestion or Cough    Seizure disorder ([address]  -     Comprehensive Metabolic Panel; Future  -     Cancel: Valproic acid level, total and free;  Future, lab failed to collect valproic acid level. Has been stable last 3 checks. Will obtain at next visit. Acquired hypothyroidism  -     CBC Auto Differential; Future  -     TSH with Reflex; Future    Excessive cerumen in ear canal, bilateral  -     carbamide peroxide (DEBROX) 6.5 % otic solution; Place 5 drops in ear(s) 2 times daily for 5 days    Spent a significant amount of this visit 15 of this 25 minutes, discussing patient's history, medications, and how the parents are coping with him in the home environment as both parents are significantly advanced in age. There are no discontinued medications. PATIENT COUNSELING     Counseling was provided today regarding the following topics: Healthy eating habits, Regular exercise, and healthy sleep habits.     Discussed use, benefit, and side effects of prescribed medications. Barriers to medication compliance addressed. All of the parents questions answered. Patient's parents voice understanding. Return in about 3 months (around 6/14/2019), or if symptoms worsen or fail to improve.     ARMANDO German

## 2019-03-15 LAB
A/G RATIO: 0.8 (ref 0.8–2)
ALBUMIN SERPL-MCNC: 3.4 G/DL (ref 3.4–4.8)
ALP BLD-CCNC: 48 U/L (ref 25–100)
ALT SERPL-CCNC: 36 U/L (ref 4–36)
ANION GAP SERPL CALCULATED.3IONS-SCNC: 12 MMOL/L (ref 3–16)
AST SERPL-CCNC: 30 U/L (ref 8–33)
BASOPHILS ABSOLUTE: 0.1 K/UL (ref 0–0.1)
BASOPHILS RELATIVE PERCENT: 0.8 %
BILIRUB SERPL-MCNC: 0.4 MG/DL (ref 0.3–1.2)
BUN BLDV-MCNC: 9 MG/DL (ref 6–20)
CALCIUM SERPL-MCNC: 9.4 MG/DL (ref 8.5–10.5)
CHLORIDE BLD-SCNC: 108 MMOL/L (ref 98–107)
CO2: 23 MMOL/L (ref 20–30)
CREAT SERPL-MCNC: 0.7 MG/DL (ref 0.4–1.2)
EOSINOPHILS ABSOLUTE: 0.6 K/UL (ref 0–0.4)
EOSINOPHILS RELATIVE PERCENT: 5.8 %
GFR AFRICAN AMERICAN: >59
GFR NON-AFRICAN AMERICAN: >59
GLOBULIN: 4.3 G/DL
GLUCOSE BLD-MCNC: 77 MG/DL (ref 74–106)
HCT VFR BLD CALC: 49 % (ref 40–54)
HEMOGLOBIN: 15.6 G/DL (ref 13–18)
IMMATURE GRANULOCYTES #: 0.1 K/UL
IMMATURE GRANULOCYTES %: 0.9 % (ref 0–5)
LYMPHOCYTES ABSOLUTE: 4.8 K/UL (ref 1.5–4)
LYMPHOCYTES RELATIVE PERCENT: 44.6 %
MCH RBC QN AUTO: 31.3 PG (ref 27–32)
MCHC RBC AUTO-ENTMCNC: 31.8 G/DL (ref 31–35)
MCV RBC AUTO: 98.2 FL (ref 80–100)
MONOCYTES ABSOLUTE: 1.6 K/UL (ref 0.2–0.8)
MONOCYTES RELATIVE PERCENT: 15.2 %
NEUTROPHILS ABSOLUTE: 3.5 K/UL (ref 2–7.5)
NEUTROPHILS RELATIVE PERCENT: 32.7 %
PDW BLD-RTO: 14.3 % (ref 11–16)
PLATELET # BLD: 137 K/UL (ref 150–400)
POTASSIUM SERPL-SCNC: 4.8 MMOL/L (ref 3.4–5.1)
RBC # BLD: 4.99 M/UL (ref 4.5–6)
SODIUM BLD-SCNC: 143 MMOL/L (ref 136–145)
TOTAL PROTEIN: 7.7 G/DL (ref 6.4–8.3)
TSH REFLEX: 1.34 UIU/ML (ref 0.35–5.5)
WBC # BLD: 10.8 K/UL (ref 4–11)

## 2019-04-07 ASSESSMENT — ENCOUNTER SYMPTOMS: COUGH: 1

## 2019-05-29 ENCOUNTER — OFFICE VISIT (OUTPATIENT)
Dept: FAMILY MEDICINE CLINIC | Age: 49
End: 2019-05-29
Payer: MEDICARE

## 2019-05-29 DIAGNOSIS — F84.0 AUTISM: ICD-10-CM

## 2019-05-29 DIAGNOSIS — G40.909 SEIZURE DISORDER (HCC): Primary | ICD-10-CM

## 2019-05-29 DIAGNOSIS — R53.1 WEAKNESS: ICD-10-CM

## 2019-05-29 PROCEDURE — G8421 BMI NOT CALCULATED: HCPCS | Performed by: FAMILY MEDICINE

## 2019-05-29 PROCEDURE — 99342 HOME/RES VST NEW LOW MDM 30: CPT | Performed by: FAMILY MEDICINE

## 2019-05-29 PROCEDURE — 1036F TOBACCO NON-USER: CPT | Performed by: FAMILY MEDICINE

## 2019-05-29 PROCEDURE — 3017F COLORECTAL CA SCREEN DOC REV: CPT | Performed by: FAMILY MEDICINE

## 2019-06-16 NOTE — PROGRESS NOTES
SUBJECTIVE:    Patient ID: Ashley Barrientos is a 44 y.o. male. Chief Complaint   Patient presents with    Seizures    Extremity Weakness       HPI: Home visit    Review of Systems    OBJECTIVE:  Wt Readings from Last 3 Encounters:   No data found for Wt     BP Readings from Last 3 Encounters:   03/14/19 102/68   09/19/18 98/80   01/31/18 120/64      Pulse Readings from Last 3 Encounters:   03/14/19 64   09/19/18 69   02/15/18 65     There is no height or weight on file to calculate BMI. Resp Readings from Last 3 Encounters:   03/14/19 16   09/19/18 20   01/31/18 24     Physical Exam    No results found for requested labs within last 30 days. Hemoglobin A1C (%)   Date Value   09/19/2018 5.4     LDL Calculated (mg/dL)   Date Value   12/01/2016 138 (H)         Lab Results   Component Value Date    WBC 10.8 03/14/2019    NEUTROABS 3.5 03/14/2019    HGB 15.6 03/14/2019    HCT 49.0 03/14/2019    MCV 98.2 03/14/2019     03/14/2019       Lab Results   Component Value Date    TSH 1.48 08/15/2017         ASSESSMENT:    Diagnosis Orders   1. Seizure disorder ([address]     2. Weakness     3. Autism          PLAN: Form for home visit scanned media for this date     There are no discontinued medications.     Controlled Substances Monitoring:

## 2019-07-16 RX ORDER — AMOXICILLIN 400 MG/5ML
400 POWDER, FOR SUSPENSION ORAL 2 TIMES DAILY
Qty: 100 ML | Refills: 0 | Status: SHIPPED | OUTPATIENT
Start: 2019-07-16 | End: 2019-07-26

## 2019-08-05 ENCOUNTER — OFFICE VISIT (OUTPATIENT)
Dept: FAMILY MEDICINE CLINIC | Age: 49
End: 2019-08-05
Payer: MEDICARE

## 2019-08-05 DIAGNOSIS — G40.909 SEIZURE DISORDER (HCC): ICD-10-CM

## 2019-08-05 DIAGNOSIS — Z87.01 HISTORY OF RECENT PNEUMONIA: ICD-10-CM

## 2019-08-05 DIAGNOSIS — R53.1 WEAKNESS: Primary | ICD-10-CM

## 2019-08-05 DIAGNOSIS — F84.0 AUTISM: ICD-10-CM

## 2019-08-05 PROCEDURE — 1036F TOBACCO NON-USER: CPT | Performed by: FAMILY MEDICINE

## 2019-08-05 PROCEDURE — G8421 BMI NOT CALCULATED: HCPCS | Performed by: FAMILY MEDICINE

## 2019-08-05 PROCEDURE — 99348 HOME/RES VST EST LOW MDM 30: CPT | Performed by: FAMILY MEDICINE

## 2019-08-05 PROCEDURE — 3017F COLORECTAL CA SCREEN DOC REV: CPT | Performed by: FAMILY MEDICINE

## 2019-08-07 RX ORDER — LEVOFLOXACIN 25 MG/ML
500 SOLUTION ORAL DAILY
Qty: 200 ML | Refills: 0 | Status: SHIPPED | OUTPATIENT
Start: 2019-08-07 | End: 2019-08-17

## 2019-08-08 ENCOUNTER — TELEPHONE (OUTPATIENT)
Dept: FAMILY MEDICINE CLINIC | Age: 49
End: 2019-08-08

## 2019-08-26 ENCOUNTER — TELEPHONE (OUTPATIENT)
Dept: FAMILY MEDICINE CLINIC | Age: 49
End: 2019-08-26

## 2019-08-28 RX ORDER — DIVALPROEX SODIUM 125 MG/1
500 CAPSULE, COATED PELLETS ORAL 3 TIMES DAILY
Qty: 360 CAPSULE | Refills: 5 | Status: SHIPPED | OUTPATIENT
Start: 2019-08-28 | End: 2020-02-20

## 2019-09-05 NOTE — TELEPHONE ENCOUNTER
I contacted the pharmacy - they can not get Topamax in a capsule or sprinkle form. They do have pill crushers available for purchase and recommended the family try that.      I notified Raheem Grnager, patient's sister/caretaker

## 2019-09-06 NOTE — TELEPHONE ENCOUNTER
Now the pharmacy is saying they can get the Topamax 25mg in capsules. The sig would need to be take 4 capsules 4 times daily to equal his current dose which is 100mg QID. The medication is pending approval if you agree for him to take it that way.

## 2019-09-06 NOTE — TELEPHONE ENCOUNTER
Denisse Message from Fernando Escobar: I called the pharmacy and talked to [facility] just now, the reason I did this was because Dad was very sure she said they can get it. So this is what she told me; they can't get it in his specific dosage but they can get it in 25 mg caps that can be given in increments of 4 at a time just like he takes the other medicine Dr Jael Hdez changed so they could sprinkle it in to food. The reason crushing the tablet isn't working is because it has a coating on it that won't crust up, they have tried 3 different types of pill crushers and they would not crush that pill. The pharmacist said that there is a shortage on the generic right now but should be able to get it in around the middle of the month they just need a script for it.

## 2019-09-09 ENCOUNTER — OFFICE VISIT (OUTPATIENT)
Dept: FAMILY MEDICINE CLINIC | Age: 49
End: 2019-09-09
Payer: MEDICARE

## 2019-09-09 DIAGNOSIS — R53.1 WEAKNESS: ICD-10-CM

## 2019-09-09 DIAGNOSIS — G40.909 SEIZURE DISORDER (HCC): ICD-10-CM

## 2019-09-09 DIAGNOSIS — F84.0 AUTISM: Primary | ICD-10-CM

## 2019-09-09 PROCEDURE — G8421 BMI NOT CALCULATED: HCPCS | Performed by: FAMILY MEDICINE

## 2019-09-09 PROCEDURE — 99348 HOME/RES VST EST LOW MDM 30: CPT | Performed by: FAMILY MEDICINE

## 2019-09-09 RX ORDER — TOPIRAMATE 25 MG/1
CAPSULE, COATED PELLETS ORAL
Qty: 480 CAPSULE | Refills: 2 | Status: SHIPPED | OUTPATIENT
Start: 2019-09-09

## 2019-09-15 ASSESSMENT — ENCOUNTER SYMPTOMS
COUGH: 1
SHORTNESS OF BREATH: 1

## 2019-09-23 ENCOUNTER — TELEPHONE (OUTPATIENT)
Dept: FAMILY MEDICINE CLINIC | Age: 49
End: 2019-09-23

## 2019-09-23 RX ORDER — CEPHALEXIN 250 MG/5ML
500 POWDER, FOR SUSPENSION ORAL 4 TIMES DAILY
Qty: 400 ML | Refills: 0 | Status: SHIPPED | OUTPATIENT
Start: 2019-09-23 | End: 2019-10-03

## 2019-09-23 NOTE — TELEPHONE ENCOUNTER
Erica, with Home Health called, requested antibiotic for patient's left buttock wound. Reports he has a 2x2 open wound with slight redness and minimal drainage. She requested the antibiotic be sent in liquid form     She did report that otherwise Dmitriy Rodriguez is doing much better.

## 2019-09-29 ASSESSMENT — ENCOUNTER SYMPTOMS
COUGH: 1
SHORTNESS OF BREATH: 1

## 2019-09-30 DIAGNOSIS — K21.9 GASTROESOPHAGEAL REFLUX DISEASE WITHOUT ESOPHAGITIS: ICD-10-CM

## 2019-09-30 NOTE — PROGRESS NOTES
Lab Results   Component Value Date    WBC 10.8 03/14/2019    NEUTROABS 3.5 03/14/2019    HGB 15.6 03/14/2019    HCT 49.0 03/14/2019    MCV 98.2 03/14/2019     03/14/2019       Lab Results   Component Value Date    TSH 1.48 08/15/2017         ASSESSMENT:    Diagnosis Orders   1. Autism     2. Seizure disorder ([facility]     3. Weakness          PLAN:  [employer] system. We discussed some things like functionally better but including may be sensible. There are no discontinued medications.     Controlled Substances Monitoring:

## 2019-10-01 ENCOUNTER — TELEPHONE (OUTPATIENT)
Dept: FAMILY MEDICINE CLINIC | Age: 49
End: 2019-10-01

## 2019-10-01 NOTE — TELEPHONE ENCOUNTER
Erica called to give progress report. Erica stated that patient has a rash. The rash started after patient started Keflex medication last Tuesday. Patient stopped medication on Saturday. Rash has improved since stopping medication.

## 2019-10-14 ENCOUNTER — TELEPHONE (OUTPATIENT)
Dept: FAMILY MEDICINE CLINIC | Age: 49
End: 2019-10-14

## 2019-10-14 DIAGNOSIS — E03.9 HYPOTHYROIDISM, UNSPECIFIED TYPE: ICD-10-CM

## 2019-10-14 RX ORDER — LEVOTHYROXINE SODIUM 25 MCG
TABLET ORAL
Qty: 30 TABLET | Refills: 2 | Status: SHIPPED | OUTPATIENT
Start: 2019-10-14 | End: 2020-01-07

## 2019-11-18 ENCOUNTER — OFFICE VISIT (OUTPATIENT)
Dept: FAMILY MEDICINE CLINIC | Age: 49
End: 2019-11-18
Payer: MEDICARE

## 2019-11-18 DIAGNOSIS — F84.0 AUTISM: Primary | ICD-10-CM

## 2019-11-18 DIAGNOSIS — R53.1 WEAKNESS: ICD-10-CM

## 2019-11-18 DIAGNOSIS — G40.909 SEIZURE DISORDER (HCC): ICD-10-CM

## 2019-11-18 DIAGNOSIS — Z87.01 HISTORY OF RECENT PNEUMONIA: ICD-10-CM

## 2019-11-18 PROCEDURE — 3017F COLORECTAL CA SCREEN DOC REV: CPT | Performed by: FAMILY MEDICINE

## 2019-11-18 PROCEDURE — G8421 BMI NOT CALCULATED: HCPCS | Performed by: FAMILY MEDICINE

## 2019-11-18 PROCEDURE — 99348 HOME/RES VST EST LOW MDM 30: CPT | Performed by: FAMILY MEDICINE

## 2019-11-18 PROCEDURE — 1036F TOBACCO NON-USER: CPT | Performed by: FAMILY MEDICINE

## 2019-11-18 PROCEDURE — G8484 FLU IMMUNIZE NO ADMIN: HCPCS | Performed by: FAMILY MEDICINE

## 2019-11-24 ASSESSMENT — ENCOUNTER SYMPTOMS
COUGH: 1
SHORTNESS OF BREATH: 1

## 2020-01-07 RX ORDER — LEVOTHYROXINE SODIUM 25 MCG
TABLET ORAL
Qty: 30 TABLET | Refills: 2 | Status: SHIPPED | OUTPATIENT
Start: 2020-01-07 | End: 2020-04-06

## 2020-01-30 RX ORDER — AZITHROMYCIN 200 MG/5ML
400 POWDER, FOR SUSPENSION ORAL DAILY
Qty: 50 ML | Refills: 0 | Status: SHIPPED | OUTPATIENT
Start: 2020-01-30 | End: 2020-02-04

## 2020-02-20 RX ORDER — DIVALPROEX SODIUM 125 MG/1
500 CAPSULE, COATED PELLETS ORAL 3 TIMES DAILY
Qty: 360 CAPSULE | Refills: 5 | Status: SHIPPED | OUTPATIENT
Start: 2020-02-20 | End: 2020-03-21

## 2020-02-27 ENCOUNTER — TELEPHONE (OUTPATIENT)
Dept: FAMILY MEDICINE CLINIC | Age: 50
End: 2020-02-27

## 2020-03-09 ENCOUNTER — OFFICE VISIT (OUTPATIENT)
Dept: FAMILY MEDICINE CLINIC | Age: 50
End: 2020-03-09
Payer: MEDICARE

## 2020-03-09 PROCEDURE — 3017F COLORECTAL CA SCREEN DOC REV: CPT | Performed by: FAMILY MEDICINE

## 2020-03-09 PROCEDURE — G8484 FLU IMMUNIZE NO ADMIN: HCPCS | Performed by: FAMILY MEDICINE

## 2020-03-09 PROCEDURE — 1036F TOBACCO NON-USER: CPT | Performed by: FAMILY MEDICINE

## 2020-03-09 PROCEDURE — G8421 BMI NOT CALCULATED: HCPCS | Performed by: FAMILY MEDICINE

## 2020-03-09 PROCEDURE — 99348 HOME/RES VST EST LOW MDM 30: CPT | Performed by: FAMILY MEDICINE

## 2020-04-06 RX ORDER — LEVOTHYROXINE SODIUM 25 MCG
TABLET ORAL
Qty: 30 TABLET | Refills: 2 | Status: SHIPPED | OUTPATIENT
Start: 2020-04-06

## 2020-04-06 NOTE — TELEPHONE ENCOUNTER
Refill request received from pharmacy. Medication pending for approval.       Last Office Visit With PCP:  3/9/2020    Next Visit Date:  No future appointments.     NIMA URBINA

## 2020-05-11 ENCOUNTER — TELEPHONE (OUTPATIENT)
Dept: FAMILY MEDICINE CLINIC | Age: 50
End: 2020-05-11

## 2020-05-12 RX ORDER — AZITHROMYCIN 250 MG/1
250 TABLET, FILM COATED ORAL SEE ADMIN INSTRUCTIONS
Qty: 6 TABLET | Refills: 0 | Status: SHIPPED | OUTPATIENT
Start: 2020-05-12 | End: 2020-05-13

## 2020-05-13 RX ORDER — AZITHROMYCIN 200 MG/5ML
POWDER, FOR SUSPENSION ORAL
Qty: 25 ML | Refills: 0 | Status: SHIPPED | OUTPATIENT
Start: 2020-05-13

## 2020-06-08 ENCOUNTER — OFFICE VISIT (OUTPATIENT)
Dept: FAMILY MEDICINE CLINIC | Age: 50
End: 2020-06-08
Payer: MEDICARE

## 2020-06-08 PROCEDURE — G8421 BMI NOT CALCULATED: HCPCS | Performed by: FAMILY MEDICINE

## 2020-06-08 PROCEDURE — 1036F TOBACCO NON-USER: CPT | Performed by: FAMILY MEDICINE

## 2020-06-08 PROCEDURE — 99348 HOME/RES VST EST LOW MDM 30: CPT | Performed by: FAMILY MEDICINE

## 2020-06-08 PROCEDURE — 3017F COLORECTAL CA SCREEN DOC REV: CPT | Performed by: FAMILY MEDICINE

## 2020-08-03 NOTE — PROGRESS NOTES
SUBJECTIVE:    Patient ID: Claude Taylor is a 44 y.o. male. No chief complaint on file. HPI: home visit    Review of Systems     OBJECTIVE:  There were no vitals taken for this visit. Wt Readings from Last 3 Encounters:   No data found for Wt     BP Readings from Last 3 Encounters:   03/14/19 102/68   09/19/18 98/80   01/31/18 120/64      Pulse Readings from Last 3 Encounters:   03/14/19 64   09/19/18 69   02/15/18 65     There is no height or weight on file to calculate BMI. Resp Readings from Last 3 Encounters:   03/14/19 16   09/19/18 20   01/31/18 24     Past medical, surgical, family and social history were reviewed and updated with the patient. Physical Exam         No results found for requested labs within last 30 days. Hemoglobin A1C (%)   Date Value   09/19/2018 5.4     LDL Calculated (mg/dL)   Date Value   12/01/2016 138 (H)       Lab Results   Component Value Date    WBC 10.8 03/14/2019    NEUTROABS 3.5 03/14/2019    HGB 15.6 03/14/2019    HCT 49.0 03/14/2019    MCV 98.2 03/14/2019     03/14/2019     Lab Results   Component Value Date    TSH 1.48 08/15/2017       ASSESSMENT:    Diagnosis Orders   1. Weight loss     2. Seizure disorder ([address]     3. Autism     4. Weakness     5. Decubitus ulcer of sacral region, stage 2 ([address]     6. Dysphagia, unspecified type          PLAN:  See home visit form above     There are no discontinued medications. Controlled Substances Monitoring:      Please note: This chart was generated using Dragon dictation software. Although every effort was made to ensure the accuracy of this automated transcription, some errors in transcription may have occurred.